# Patient Record
Sex: FEMALE | Race: WHITE | NOT HISPANIC OR LATINO | Employment: UNEMPLOYED | URBAN - METROPOLITAN AREA
[De-identification: names, ages, dates, MRNs, and addresses within clinical notes are randomized per-mention and may not be internally consistent; named-entity substitution may affect disease eponyms.]

---

## 2019-05-28 ENCOUNTER — TRANSCRIBE ORDERS (OUTPATIENT)
Dept: ADMINISTRATIVE | Facility: HOSPITAL | Age: 64
End: 2019-05-28

## 2019-05-28 ENCOUNTER — APPOINTMENT (OUTPATIENT)
Dept: LAB | Facility: HOSPITAL | Age: 64
End: 2019-05-28
Attending: PHYSICAL MEDICINE & REHABILITATION
Payer: OTHER MISCELLANEOUS

## 2019-05-28 DIAGNOSIS — Z79.891 LONG TERM (CURRENT) USE OF OPIATE ANALGESIC: Primary | ICD-10-CM

## 2019-05-28 PROCEDURE — 80307 DRUG TEST PRSMV CHEM ANLYZR: CPT | Performed by: PHYSICAL MEDICINE & REHABILITATION

## 2019-05-28 PROCEDURE — 80365 DRUG SCREENING OXYCODONE: CPT | Performed by: PHYSICAL MEDICINE & REHABILITATION

## 2019-06-01 LAB
AMPHETAMINES UR QL SCN: NEGATIVE NG/ML
BARBITURATES UR QL SCN: NEGATIVE NG/ML
BENZODIAZ UR QL: NEGATIVE NG/ML
BZE UR QL: NEGATIVE NG/ML
CANNABINOIDS UR QL SCN: NEGATIVE NG/ML
METHADONE UR QL SCN: NEGATIVE NG/ML
OPIATES UR QL: NEGATIVE
PCP UR QL: NEGATIVE NG/ML
PROPOXYPH UR QL SCN: NEGATIVE NG/ML

## 2019-06-06 LAB
OXYCODONE UR QL CFM: 617 NG/ML
OXYCODONE+OXYMORPHONE SERPLBLD QL SCN: POSITIVE
OXYCODONE+OXYMORPHONE UR QL SCN: NORMAL NG/ML
OXYCODONE+OXYMORPHONE UR QL SCN: POSITIVE
OXYMORPHONE UR CFM-MCNC: 1048 NG/ML
OXYMORPHONE UR QL CFM: POSITIVE

## 2019-08-23 ENCOUNTER — TRANSCRIBE ORDERS (OUTPATIENT)
Dept: ULTRASOUND IMAGING | Facility: CLINIC | Age: 64
End: 2019-08-23

## 2019-08-23 DIAGNOSIS — M51.24 DISPLACEMENT OF THORACIC INTERVERTEBRAL DISC WITHOUT MYELOPATHY: Primary | ICD-10-CM

## 2019-09-05 ENCOUNTER — HOSPITAL ENCOUNTER (OUTPATIENT)
Dept: MRI IMAGING | Facility: CLINIC | Age: 64
Discharge: HOME | End: 2019-09-05
Payer: OTHER MISCELLANEOUS

## 2019-09-05 DIAGNOSIS — M51.24 DISPLACEMENT OF THORACIC INTERVERTEBRAL DISC WITHOUT MYELOPATHY: ICD-10-CM

## 2019-09-05 PROCEDURE — 72157 MRI CHEST SPINE W/O & W/DYE: CPT

## 2019-09-05 PROCEDURE — A9585 GADOBUTROL INJECTION: HCPCS | Performed by: PHYSICAL MEDICINE & REHABILITATION

## 2019-09-05 RX ADMIN — GADOBUTROL 9 ML: 604.72 INJECTION INTRAVENOUS at 10:12

## 2019-09-09 ENCOUNTER — TRANSCRIBE ORDERS (OUTPATIENT)
Dept: NEUROSURGERY | Facility: CLINIC | Age: 64
End: 2019-09-09

## 2019-09-09 DIAGNOSIS — M54.9 MID BACK PAIN: Primary | ICD-10-CM

## 2019-09-12 ENCOUNTER — CONSULT (OUTPATIENT)
Dept: NEUROSURGERY | Facility: CLINIC | Age: 64
End: 2019-09-12
Payer: OTHER MISCELLANEOUS

## 2019-09-12 VITALS
HEIGHT: 64 IN | BODY MASS INDEX: 35.17 KG/M2 | DIASTOLIC BLOOD PRESSURE: 74 MMHG | HEART RATE: 81 BPM | RESPIRATION RATE: 16 BRPM | SYSTOLIC BLOOD PRESSURE: 145 MMHG | WEIGHT: 206 LBS | TEMPERATURE: 97.6 F

## 2019-09-12 DIAGNOSIS — M54.9 MID BACK PAIN: ICD-10-CM

## 2019-09-12 DIAGNOSIS — D49.2 NERVE SHEATH TUMOR: Primary | ICD-10-CM

## 2019-09-12 PROCEDURE — 99243 OFF/OP CNSLTJ NEW/EST LOW 30: CPT | Performed by: NEUROLOGICAL SURGERY

## 2019-09-12 RX ORDER — LISINOPRIL AND HYDROCHLOROTHIAZIDE 12.5; 1 MG/1; MG/1
1 TABLET ORAL DAILY
Refills: 0 | COMMUNITY
Start: 2019-08-23

## 2019-09-12 RX ORDER — DULOXETIN HYDROCHLORIDE 60 MG/1
60 CAPSULE, DELAYED RELEASE ORAL
COMMUNITY
Start: 2014-06-19

## 2019-09-12 RX ORDER — TIZANIDINE 4 MG/1
TABLET ORAL
COMMUNITY
Start: 2014-06-19 | End: 2022-07-08

## 2019-09-12 RX ORDER — AMLODIPINE BESYLATE 10 MG/1
TABLET ORAL
COMMUNITY

## 2019-09-12 RX ORDER — DULOXETIN HYDROCHLORIDE 30 MG/1
30 CAPSULE, DELAYED RELEASE ORAL DAILY
Refills: 1 | COMMUNITY
Start: 2019-08-19

## 2019-09-12 RX ORDER — OXYCODONE HYDROCHLORIDE 10 MG/1
TABLET ORAL 3 TIMES DAILY
COMMUNITY

## 2019-09-12 RX ORDER — PREGABALIN 100 MG/1
CAPSULE ORAL
Refills: 0 | COMMUNITY
Start: 2019-07-29

## 2019-09-12 RX ORDER — PANTOPRAZOLE SODIUM 40 MG/1
TABLET, DELAYED RELEASE ORAL
COMMUNITY

## 2019-09-12 NOTE — PROGRESS NOTES
Assessment/Plan:    No problem-specific Assessment & Plan notes found for this encounter  Problem List Items Addressed This Visit     None      Visit Diagnoses     Nerve sheath tumor    -  Primary    Mid back pain                Subjective:      Patient ID: Mahnaz Bentley is a 75 y o  female  HPI    The following portions of the patient's history were reviewed and updated as appropriate:     She  has a past medical history of Back injury (2000), Diabetes ([address] ), Herniated intervertebral disc of lumbar spine (2000), and HTN (hypertension)  She There are no active problems to display for this patient  She  has a past surgical history that includes Cyst Removal (1997); Hysterectomy (1994); and Knee surgery (2003)  Her family history is not on file  She  reports that she has never smoked  She uses smokeless tobacco  She reports that she drank alcohol  She reports that she has current or past drug history  Drug: Marijuana  Current Outpatient Medications   Medication Sig Dispense Refill    amLODIPine (NORVASC) 10 mg tablet amlodipine 10 mg tablet   Take 1 tablet every day by oral route for 90 days   DULoxetine (CYMBALTA) 30 mg delayed release capsule Take 30 mg by mouth daily  1    DULoxetine (CYMBALTA) 60 mg delayed release capsule Take 60 mg by mouth      insulin aspart (NOVOLOG FLEXPEN) 100 Units/mL injection pen Novolog Flexpen U-100 Insulin aspart 100 unit/mL (3 mL) subcutaneous      insulin glargine (BASAGLAR KWIKPEN) 100 units/mL injection pen Inject 55 Units under the skin daily      lisinopril-hydrochlorothiazide (PRINZIDE,ZESTORETIC) 10-12 5 MG per tablet Take 1 tablet by mouth daily  0    metFORMIN (GLUCOPHAGE) 500 mg tablet Take 2 tabs  02      oxyCODONE (ROXICODONE) 10 MG TABS 3 (three) times a day      pantoprazole (PROTONIX) 40 mg tablet pantoprazole 40 mg tablet,delayed release   Take 1 tablet twice a day by oral route for 30 days        pregabalin (LYRICA) 100 mg capsule TAKE ONE CAPSULE BY MOUTH THREE TIMES DAILY  0    tiZANidine (ZANAFLEX) 4 mg tablet tizanidine 4 mg tablet   Take 1 tablet every day by oral route at bedtime  No current facility-administered medications for this visit  Current Outpatient Medications on File Prior to Visit   Medication Sig    amLODIPine (NORVASC) 10 mg tablet amlodipine 10 mg tablet   Take 1 tablet every day by oral route for 90 days   DULoxetine (CYMBALTA) 30 mg delayed release capsule Take 30 mg by mouth daily    DULoxetine (CYMBALTA) 60 mg delayed release capsule Take 60 mg by mouth    insulin aspart (NOVOLOG FLEXPEN) 100 Units/mL injection pen Novolog Flexpen U-100 Insulin aspart 100 unit/mL (3 mL) subcutaneous    insulin glargine (BASAGLAR KWIKPEN) 100 units/mL injection pen Inject 55 Units under the skin daily    lisinopril-hydrochlorothiazide (PRINZIDE,ZESTORETIC) 10-12 5 MG per tablet Take 1 tablet by mouth daily    metFORMIN (GLUCOPHAGE) 500 mg tablet Take 2 tabs  02    oxyCODONE (ROXICODONE) 10 MG TABS 3 (three) times a day    pantoprazole (PROTONIX) 40 mg tablet pantoprazole 40 mg tablet,delayed release   Take 1 tablet twice a day by oral route for 30 days   pregabalin (LYRICA) 100 mg capsule TAKE ONE CAPSULE BY MOUTH THREE TIMES DAILY    tiZANidine (ZANAFLEX) 4 mg tablet tizanidine 4 mg tablet   Take 1 tablet every day by oral route at bedtime  No current facility-administered medications on file prior to visit  She is allergic to penicillins       Review of Systems   Constitutional: Negative  HENT: Negative  Eyes: Negative  Respiratory: Negative  Cardiovascular: Negative  Gastrointestinal: Positive for constipation  Endocrine: Negative  Genitourinary: Positive for urgency  Musculoskeletal: Positive for back pain (mid back pain) and gait problem (feels off balance, restless leg syndrome)  Allergic/Immunologic: Negative      Neurological: Negative for weakness, light-headedness and numbness  Hematological: Negative  Psychiatric/Behavioral: Positive for sleep disturbance (due pain)  Objective:      /74 (BP Location: Left arm, Patient Position: Sitting, Cuff Size: Standard)   Pulse 81   Temp 97 6 °F (36 4 °C) (Tympanic)   Resp 16   Ht 5' 4" (1 626 m)   Wt 93 4 kg (206 lb)   BMI 35 36 kg/m²           I have personally obtain history and examined patient  I have personally reviewed case including all pertinent investigations/studies  Time spent 40 minutes  More than 50% of total time spent on counseling and coordination of care as described above including patient education, discussion of risks and rationale of conservative vs surgical treatment options  HPI    21 yr hx of midthoracic back pain with episodic radiation to right flank/chest in discrete, reproducible distribution  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, first diagnosed at [facility] yrs ago  Chronic oxycontin dependence with recent switch to oxycodone 5 mg TID and gabapentin 75 mg TID, with increased back pain  Denies weakness, gait imbalance, bowel or bladder deficit      Exam    No palpation tenderness TL spine  No numbness flank/anterior chest wall  Full strength bilateral LE  Negative SLR  Intact DTR  Intact sensation   Reduced ROM  Normal gait  Normal tandem                        Back:     Symmetric, no curvature, ROM normal, no CVA tenderness       Chest wall:    No tenderness or deformity                   Extremities:   Extremities normal, atraumatic, no cyanosis or edema   Pulses:   2+ and symmetric all extremities   Skin:   Skin color, texture, turgor normal, no rashes or lesions     Radiology    MRI    2 5 cm homogenously enhancing extraforaminal right T8/9 mass with associated rib head remodelling, abutting VB  Moderate T8/9 disc hernation with modest right paracentral component with displacement of thecal sac without jaylan compression or signal change  Summary and Plan    Ms Rd Yeung has chonic 17 yr hx of thoracic back pain with long standing established diagnosis of T8/9 disc herniation and associated right sided extraforaminal nerve sheath tumor  She was recently taken off of her long-time oxycontin 30 mg BID and placed on oxycodone 5 TID for withdrawal  We reviewed the conservative medical options including PT, Dorys and medications  I asked her to fu with her PM specialist to discuss increasing gabapentin to 300 mg TID  I explained to her that surgical resection of the benign nerve sheath tumor, especially given it location, would be no small endeavor and would not relieve her chronic pain, with elevated risk of complication  I will see her on a PRN basis

## 2022-07-08 ENCOUNTER — HOSPITAL ENCOUNTER (EMERGENCY)
Facility: HOSPITAL | Age: 67
Discharge: HOME | End: 2022-07-08
Attending: EMERGENCY MEDICINE | Admitting: EMERGENCY MEDICINE
Payer: MEDICARE

## 2022-07-08 ENCOUNTER — APPOINTMENT (EMERGENCY)
Dept: RADIOLOGY | Facility: HOSPITAL | Age: 67
End: 2022-07-08
Payer: MEDICARE

## 2022-07-08 VITALS
BODY MASS INDEX: 35 KG/M2 | DIASTOLIC BLOOD PRESSURE: 87 MMHG | OXYGEN SATURATION: 95 % | SYSTOLIC BLOOD PRESSURE: 146 MMHG | RESPIRATION RATE: 18 BRPM | TEMPERATURE: 97.2 F | HEIGHT: 64 IN | HEART RATE: 84 BPM | WEIGHT: 205 LBS

## 2022-07-08 DIAGNOSIS — J06.9 VIRAL URI WITH COUGH: ICD-10-CM

## 2022-07-08 DIAGNOSIS — Z20.822 ENCOUNTER FOR LABORATORY TESTING FOR COVID-19 VIRUS: Primary | ICD-10-CM

## 2022-07-08 LAB
ATRIAL RATE: 78 BPM
FLUAV RNA RESP QL NAA+PROBE: NEGATIVE
FLUBV RNA RESP QL NAA+PROBE: NEGATIVE
P AXIS: 22 DEGREES
PR INTERVAL: 158 MS
QRS AXIS: -8 DEGREES
QRSD INTERVAL: 76 MS
QT INTERVAL: 392 MS
QTC INTERVAL: 446 MS
RSV RNA RESP QL NAA+PROBE: NEGATIVE
SARS-COV-2 RNA RESP QL NAA+PROBE: POSITIVE
T WAVE AXIS: 41 DEGREES
VENTRICULAR RATE: 78 BPM

## 2022-07-08 PROCEDURE — 99284 EMERGENCY DEPT VISIT MOD MDM: CPT | Performed by: EMERGENCY MEDICINE

## 2022-07-08 PROCEDURE — 0241U HB NFCT DS VIR RESP RNA 4 TRGT: CPT | Performed by: EMERGENCY MEDICINE

## 2022-07-08 PROCEDURE — 99285 EMERGENCY DEPT VISIT HI MDM: CPT

## 2022-07-08 PROCEDURE — 93005 ELECTROCARDIOGRAM TRACING: CPT

## 2022-07-08 PROCEDURE — 93010 ELECTROCARDIOGRAM REPORT: CPT | Performed by: INTERNAL MEDICINE

## 2022-07-08 RX ORDER — LIRAGLUTIDE 6 MG/ML
INJECTION SUBCUTANEOUS DAILY
COMMUNITY

## 2022-07-08 RX ORDER — ROPINIROLE 0.5 MG/1
0.5 TABLET, FILM COATED ORAL 3 TIMES DAILY
COMMUNITY

## 2022-07-08 RX ORDER — AMMONIUM LACTATE 12 G/100G
LOTION TOPICAL 2 TIMES DAILY PRN
COMMUNITY

## 2022-07-08 NOTE — ED PROVIDER NOTES
History  Chief Complaint   Patient presents with    Shortness of Breath     Pt arrives reporting she has had congestion, productive cough, and SOB increasing over past 2 days  Pt is staying with family that is positive for covid  Patient is a 33-year-old female presents emergency department due to URI symptoms with cough congestion shortness of breath started 2 days ago patient is exposed to a family member who was positive for COVID  History provided by:  Patient  URI  Presenting symptoms: congestion, cough, rhinorrhea and sore throat    Presenting symptoms: no ear pain, no fatigue and no fever    Severity:  Mild  Onset quality:  Gradual  Duration:  2 days  Timing:  Constant  Progression:  Worsening  Chronicity:  New  Associated symptoms: no arthralgias, no headaches, no myalgias and no wheezing        Prior to Admission Medications   Prescriptions Last Dose Informant Patient Reported? Taking? DULoxetine (CYMBALTA) 30 mg delayed release capsule  Self Yes Yes   Sig: Take 30 mg by mouth daily   DULoxetine (CYMBALTA) 60 mg delayed release capsule  Self Yes Yes   Sig: Take 60 mg by mouth   Empagliflozin (Jardiance) 25 MG TABS   Yes Yes   Sig: Take 25 mg by mouth every morning   amLODIPine (NORVASC) 10 mg tablet  Self Yes Yes   Sig: amlodipine 10 mg tablet   Take 1 tablet every day by oral route for 90 days     ammonium lactate (LAC-HYDRIN) 12 % lotion   Yes Yes   Sig: Apply topically 2 (two) times a day as needed for dry skin   insulin aspart (NovoLOG) 100 Units/mL injection pen  Self Yes Yes   Sig: Novolog Flexpen U-100 Insulin aspart 100 unit/mL (3 mL) subcutaneous   insulin glargine (LANTUS SOLOSTAR) 100 units/mL injection pen  Self Yes Yes   Sig: Inject 55 Units under the skin daily   liraglutide (Victoza) injection   Yes Yes   Sig: Inject under the skin daily   lisinopril-hydrochlorothiazide (PRINZIDE,ZESTORETIC) 10-12 5 MG per tablet  Self Yes Yes   Sig: Take 1 tablet by mouth daily   metFORMIN (GLUCOPHAGE) 500 mg tablet  Self Yes Yes   Sig: Take 2 tabs  02   oxyCODONE (ROXICODONE) 10 MG TABS  Self Yes Yes   Sig: 3 (three) times a day   pantoprazole (PROTONIX) 40 mg tablet  Self Yes Yes   Sig: pantoprazole 40 mg tablet,delayed release   Take 1 tablet twice a day by oral route for 30 days  pregabalin (LYRICA) 100 mg capsule  Self Yes Yes   Sig: TAKE ONE CAPSULE BY MOUTH THREE TIMES DAILY   rOPINIRole (REQUIP) 0 5 mg tablet   Yes Yes   Sig: Take 0 5 mg by mouth 3 (three) times a day      Facility-Administered Medications: None       Past Medical History:   Diagnosis Date    Back injury 2000    Diabetes ([address] )     GI bleed     Herniated intervertebral disc of lumbar spine 2000    T8-T9    HTN (hypertension)        Past Surgical History:   Procedure Laterality Date    CYST REMOVAL  1997    Left foot    FOOT SURGERY Left     x3    HYSTERECTOMY  1994    KNEE SURGERY  2003    Left knee       History reviewed  No pertinent family history  I have reviewed and agree with the history as documented  E-Cigarette/Vaping     E-Cigarette/Vaping Substances     Social History     Tobacco Use    Smoking status: Never Smoker    Smokeless tobacco: Current User   Substance Use Topics    Alcohol use: Not Currently    Drug use: Yes     Types: Marijuana     Comment: medical marijuana       Review of Systems   Constitutional: Negative for activity change, appetite change, chills, fatigue and fever  HENT: Positive for congestion, rhinorrhea and sore throat  Negative for ear pain  Eyes: Negative for discharge, redness and visual disturbance  Respiratory: Positive for cough and shortness of breath  Negative for chest tightness and wheezing  Cardiovascular: Negative for chest pain and palpitations  Gastrointestinal: Negative for abdominal pain, constipation, diarrhea, nausea and vomiting  Endocrine: Negative for polydipsia and polyuria     Genitourinary: Negative for difficulty urinating, dysuria, frequency, hematuria and urgency  Musculoskeletal: Negative for arthralgias and myalgias  Skin: Negative for color change, pallor and rash  Neurological: Negative for dizziness, weakness, light-headedness, numbness and headaches  Hematological: Negative for adenopathy  Does not bruise/bleed easily  All other systems reviewed and are negative  Physical Exam  Physical Exam  Vitals and nursing note reviewed  Constitutional:       Appearance: She is well-developed  HENT:      Head: Normocephalic and atraumatic  Right Ear: External ear normal       Left Ear: External ear normal       Nose: Congestion and rhinorrhea present  Eyes:      Conjunctiva/sclera: Conjunctivae normal       Pupils: Pupils are equal, round, and reactive to light  Cardiovascular:      Rate and Rhythm: Normal rate and regular rhythm  Heart sounds: Normal heart sounds  Pulmonary:      Effort: Pulmonary effort is normal  No respiratory distress  Breath sounds: Normal breath sounds  No wheezing or rales  Chest:      Chest wall: No tenderness  Abdominal:      General: Bowel sounds are normal  There is no distension  Palpations: Abdomen is soft  Tenderness: There is no abdominal tenderness  There is no guarding  Musculoskeletal:         General: Normal range of motion  Cervical back: Normal range of motion and neck supple  Skin:     General: Skin is warm and dry  Neurological:      Mental Status: She is alert and oriented to person, place, and time  Cranial Nerves: No cranial nerve deficit  Sensory: No sensory deficit           Vital Signs  ED Triage Vitals [07/08/22 1219]   Temperature Pulse Respirations Blood Pressure SpO2   (!) 97 2 °F (36 2 °C) 84 18 146/87 95 %      Temp src Heart Rate Source Patient Position - Orthostatic VS BP Location FiO2 (%)   -- -- -- -- --      Pain Score       4           Vitals:    07/08/22 1219   BP: 146/87   Pulse: 84         Visual Acuity      ED Medications  Medications - No data to display    Diagnostic Studies  Results Reviewed     Procedure Component Value Units Date/Time    FLU/RSV/COVID - if FLU/RSV clinically relevant [[id number]] Collected: 07/08/22 1300    Lab Status: In process Specimen: Nares from Nasopharyngeal Swab Updated: 07/08/22 1302                 No orders to display              Procedures  ECG 12 Lead Documentation Only    Date/Time: 7/8/2022 1:26 PM  Performed by: Nelsy Matthews DO  Authorized by: Nelsy Matthews DO     ECG reviewed by me, the ED Provider: yes    Patient location:  ED  Previous ECG:     Comparison to cardiac monitor: Yes    Rate:     ECG rate assessment: normal    Rhythm:     Rhythm: sinus rhythm    QRS:     QRS axis:  Left    QRS intervals:  Normal  Conduction:     Conduction: normal    ST segments:     ST segments:  Normal  T waves:     T waves: normal               ED Course                                             MDM  Number of Diagnoses or Management Options  Encounter for laboratory testing for COVID-19 virus: new and requires workup  Viral URI with cough: new and requires workup  Diagnosis management comments: Patient is afebrile nontoxic well-appearing clinically and hemodynamically stable in the emergency department  Lungs clear to auscultation bilaterally normal O2 saturation on room air no respiratory distress at rest will test for COVID advised supportive care for suspected viral upper respiratory infection and follow up promptly with primary physician for further evaluation treatment return precautions and anticipatory guidance discussed           Amount and/or Complexity of Data Reviewed  Clinical lab tests: ordered  Decide to obtain previous medical records or to obtain history from someone other than the patient: yes  Review and summarize past medical records: yes    Risk of Complications, Morbidity, and/or Mortality  Presenting problems: low  Diagnostic procedures: low  Management options: low    Patient Progress  Patient progress: stable      Disposition  Final diagnoses:   Encounter for laboratory testing for COVID-19 virus   Viral URI with cough     Time reflects when diagnosis was documented in both MDM as applicable and the Disposition within this note     Time User Action Codes Description Comment    7/8/2022  1:19 PM Andie Cintron Add [Z20 822] Encounter for laboratory testing for COVID-19 virus     7/8/2022  1:20 PM Andie Cintron Add [J06 9] Viral URI with cough       ED Disposition     ED Disposition   Discharge    Condition   Stable    Date/Time   Fri Jul 8, 2022  1:19 PM    Comment   Niru Simon discharge to home/self care  Follow-up Information     Follow up With Specialties Details Why Kristi Fountain MD Family Medicine Schedule an appointment as soon as possible for a visit in 3 days  [address] [facility] [address]  [phone]            Discharge Medication List as of 7/8/2022  1:20 PM      CONTINUE these medications which have NOT CHANGED    Details   amLODIPine (NORVASC) 10 mg tablet amlodipine 10 mg tablet   Take 1 tablet every day by oral route for 90 days  , Historical Med      ammonium lactate (LAC-HYDRIN) 12 % lotion Apply topically 2 (two) times a day as needed for dry skin, Historical Med      !! DULoxetine (CYMBALTA) 30 mg delayed release capsule Take 30 mg by mouth daily, Starting Mon 8/19/2019, Historical Med      !!  DULoxetine (CYMBALTA) 60 mg delayed release capsule Take 60 mg by mouth, Starting Thu 6/19/2014, Historical Med      Empagliflozin (Jardiance) 25 MG TABS Take 25 mg by mouth every morning, Historical Med      insulin aspart (NovoLOG) 100 Units/mL injection pen Novolog Flexpen U-100 Insulin aspart 100 unit/mL (3 mL) subcutaneous, Historical Med      insulin glargine (LANTUS SOLOSTAR) 100 units/mL injection pen Inject 55 Units under the skin daily, Historical Med      liraglutide (Victoza) injection Inject under the skin daily, Historical Med      lisinopril-hydrochlorothiazide (PRINZIDE,ZESTORETIC) 10-12 5 MG per tablet Take 1 tablet by mouth daily, Starting Fri 8/23/2019, Historical Med      metFORMIN (GLUCOPHAGE) 500 mg tablet Take 2 tabs  02, Historical Med      oxyCODONE (ROXICODONE) 10 MG TABS 3 (three) times a day, Historical Med      pantoprazole (PROTONIX) 40 mg tablet pantoprazole 40 mg tablet,delayed release   Take 1 tablet twice a day by oral route for 30 days  , Historical Med      pregabalin (LYRICA) 100 mg capsule TAKE ONE CAPSULE BY MOUTH THREE TIMES DAILY, Historical Med      rOPINIRole (REQUIP) 0 5 mg tablet Take 0 5 mg by mouth 3 (three) times a day, Historical Med       !! - Potential duplicate medications found  Please discuss with provider  No discharge procedures on file      PDMP Review     None          ED Provider  Electronically Signed by           Rosales Nova,   07/08/22 [id number] Jackie Bauer DO  07/08/22 1977